# Patient Record
Sex: MALE | Race: WHITE | NOT HISPANIC OR LATINO
[De-identification: names, ages, dates, MRNs, and addresses within clinical notes are randomized per-mention and may not be internally consistent; named-entity substitution may affect disease eponyms.]

---

## 2021-07-19 PROBLEM — Z00.00 ENCOUNTER FOR PREVENTIVE HEALTH EXAMINATION: Status: ACTIVE | Noted: 2021-07-19

## 2021-07-20 ENCOUNTER — APPOINTMENT (OUTPATIENT)
Dept: PEDIATRIC ORTHOPEDIC SURGERY | Facility: CLINIC | Age: 86
End: 2021-07-20
Payer: MEDICARE

## 2021-07-20 VITALS — BODY MASS INDEX: 25.18 KG/M2 | HEIGHT: 69 IN | WEIGHT: 170 LBS

## 2021-07-20 PROCEDURE — 73030 X-RAY EXAM OF SHOULDER: CPT

## 2021-07-20 PROCEDURE — 99213 OFFICE O/P EST LOW 20 MIN: CPT

## 2021-07-20 NOTE — PHYSICAL EXAM
[de-identified] : On physical examination he has anterior shoulder tenderness.  He has significant inability to forward flex and abduct the shoulder past 110 degrees.  There is no shoulder instability.  The neurovascular status of the left upper extremity is intact. [de-identified] : X-ray evaluation of the left shoulder on 7/20/2021 (AP and lateral views) reveals mild degenerative changes in both the before meals and glenohumeral joints.  There is no evidence of fracture or subluxation.

## 2021-07-20 NOTE — ASSESSMENT
[FreeTextEntry1] : Left shoulder sprain\par \par I recommended symptomatic treatment.  He has been advised that if symptoms persist more than 3 weeks he should return to this office.

## 2021-07-20 NOTE — HISTORY OF PRESENT ILLNESS
[de-identified] : This 88-year-old male is here for evaluation of an injury sustained to the left shoulder after a fall 2 weeks ago.  Patient has had significant pain with difficulty in lifting his shoulder above his head.  He has had no previous history of shoulder problems.

## 2022-10-12 ENCOUNTER — APPOINTMENT (OUTPATIENT)
Dept: PEDIATRIC ORTHOPEDIC SURGERY | Facility: CLINIC | Age: 87
End: 2022-10-12

## 2022-10-12 VITALS — WEIGHT: 170 LBS | BODY MASS INDEX: 25.18 KG/M2 | HEIGHT: 69 IN | TEMPERATURE: 98.2 F

## 2022-10-12 PROCEDURE — 99213 OFFICE O/P EST LOW 20 MIN: CPT | Mod: 25

## 2022-10-12 PROCEDURE — 20610 DRAIN/INJ JOINT/BURSA W/O US: CPT

## 2022-10-12 PROCEDURE — 73030 X-RAY EXAM OF SHOULDER: CPT

## 2022-10-13 NOTE — DATA REVIEWED
[de-identified] : X-ray evaluation of the left shoulder on 10/12/2022 (AP and lateral views) reveals intra-articular calcification and degenerative arthritis of the left shoulder.

## 2022-10-13 NOTE — HISTORY OF PRESENT ILLNESS
[FreeTextEntry1] : This 89-year-old male returns for increasing left shoulder pain without history of injury.  The patient cannot forward flex or abduct the left arm because of shoulder pain.

## 2022-10-13 NOTE — PROCEDURE
[de-identified] : 2 mL of 1% lidocaine and 1 mL of 40 mg of Depo-Medrol have been injected into the left shoulder
Yes

## 2022-10-13 NOTE — PHYSICAL EXAM
[FreeTextEntry1] : On physical examination he has significant tenderness in the anterior deltoid bursa and bicipital groove.  His range of motion is 40 degrees of forward flexion and 30 degrees of abduction.  He cannot internally rotate.  There is no shoulder instability.

## 2022-10-13 NOTE — ASSESSMENT
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

## 2022-10-18 ENCOUNTER — APPOINTMENT (OUTPATIENT)
Dept: PEDIATRIC ORTHOPEDIC SURGERY | Facility: CLINIC | Age: 87
End: 2022-10-18

## 2022-10-18 VITALS — HEIGHT: 69 IN | BODY MASS INDEX: 25.18 KG/M2 | WEIGHT: 170 LBS

## 2022-10-18 DIAGNOSIS — M19.012 PRIMARY OSTEOARTHRITIS, LEFT SHOULDER: ICD-10-CM

## 2022-10-18 DIAGNOSIS — S43.422A SPRAIN OF LEFT ROTATOR CUFF CAPSULE, INITIAL ENCOUNTER: ICD-10-CM

## 2022-10-18 PROCEDURE — 99213 OFFICE O/P EST LOW 20 MIN: CPT | Mod: 25

## 2022-10-18 PROCEDURE — 20610 DRAIN/INJ JOINT/BURSA W/O US: CPT

## 2022-10-18 NOTE — PHYSICAL EXAM
[FreeTextEntry1] : On physical examination he has crepitus on range of motion.  There is tenderness in the anterior deltoid bursa and bicipital groove.  He has minimal active motion of the shoulder.

## 2022-10-18 NOTE — ASSESSMENT
[FreeTextEntry1] : DJD left shoulder\par Rotator cuff tear left shoulder\par \par This patient will continue symptomatic treatment and he will return on a as needed basis.

## 2022-10-18 NOTE — HISTORY OF PRESENT ILLNESS
[FreeTextEntry1] : This 89-year-old male returns with continued complaints of left shoulder pain secondary to degenerative arthritis and rotator cuff tear.  Cortisone shot he had last week did not help.

## 2022-10-18 NOTE — PROCEDURE
[de-identified] : 2 mL of 1% lidocaine and 1 mL of 40 mg of Depo-Medrol have been injected into the left shoulder